# Patient Record
(demographics unavailable — no encounter records)

---

## 2024-10-09 NOTE — HISTORY OF PRESENT ILLNESS
[FreeTextEntry1] : I saw patient Yeny Martinez in the office today.  The patient is a 46-year-old female who has a history of ADHD, PCOS and hypothyroidism.  The patient has a history of obesity and did have Lap-Band surgery a number of years ago.  She had significant issues after the surgery, and it was reversed.  The patient currently is on Mounjaro and lost 30 to 40 pounds.  The patient is on metformin for PCOS and is up-to-date on her gynecological exams.  The patient does not abuse caffeine or ethanol, and she does not smoke.  The patient has a family history of colon cancer and is due for a colonoscopy.  The patient states her bowel movements are normal with no blood in the stool or on the toilet tissue.  The patient had a colonoscopy done at age 30 for gastrointestinal issues.

## 2024-10-09 NOTE — ASSESSMENT
[FreeTextEntry1] : The patient is a 46-year-old female who is due for a colonoscopy due to her age and a family history of colon cancer.  The exam will be scheduled at her earliest convenience and once performed, I will distribute a copy of the results.  If normal it should be repeated at 5-year intervals.  The risks and benefits were described, and the preparation was sent to the patient's pharmacy

## 2024-10-15 NOTE — ASSESSMENT
[Levothyroxine] : The patient was instructed to take Levothyroxine on an empty stomach, separate from vitamins, and wait at least 30 minutes before eating [FreeTextEntry1] : 47 y/o female with h/o hypothyroidism in the setting of polycystic ovarian syndrome and history of obesity.  Pt responded initially to metformin but did not do quite well with weight loss after lap band surgery. Pt did not f/u with the bariatric team. She had her lap-band removed in 07/2019. Patient recently started Mounjaro 15 mg weekly with good weight loss. Patient has restarted Metformin as she believes she had more rapid weight loss with combination therapy. Patient has more recently regained weight despite ongoing tirzepatide.  This drug is no longer being covered by her insurance.  Pharmacist recommended Saxenda.  Menses are regular. Pt not planning pregnancy at this time.  Pt appears clinically euthyroid on Synthroid 125 mcg daily. No local neck pain. No dysphagia or dysphonia. No raciness, shakiness, heat/cold intolerance, tiredness, or fatigue. No palpitations or tremors.      Request labs sent out.   F/u in 6 months.

## 2024-10-15 NOTE — PHYSICAL EXAM
[Alert] : alert [Well Nourished] : well nourished [Obese] : obese [No Acute Distress] : no acute distress [Well Developed] : well developed [Normal Sclera/Conjunctiva] : normal sclera/conjunctiva [EOMI] : extra ocular movement intact [No Proptosis] : no proptosis [Thyroid Not Enlarged] : the thyroid was not enlarged [No Thyroid Nodules] : no palpable thyroid nodules [Clear to Auscultation] : lungs were clear to auscultation bilaterally [Normal S1, S2] : normal S1 and S2 [Normal Rate] : heart rate was normal [Regular Rhythm] : with a regular rhythm [No Edema] : no peripheral edema [Normal Bowel Sounds] : normal bowel sounds [Not Tender] : non-tender [Not Distended] : not distended [Soft] : abdomen soft [Normal Anterior Cervical Nodes] : no anterior cervical lymphadenopathy [No Spinal Tenderness] : no spinal tenderness [Spine Straight] : spine straight [No Stigmata of Cushings Syndrome] : no stigmata of Cushings Syndrome [Normal Gait] : normal gait [Normal Reflexes] : deep tendon reflexes were 2+ and symmetric [No Tremors] : no tremors [Oriented x3] : oriented to person, place, and time [Acanthosis Nigricans] : no acanthosis nigricans [Hirsutism] : no hirsutism

## 2024-10-15 NOTE — HISTORY OF PRESENT ILLNESS
[FreeTextEntry1] : Patient returns for a follow up visit for hypothyroidism and PCOS. Since the last visit pt has no significant interval health changes. No interval hospitalizations, fractures, or changes in medication.   Pt started Mounjaro 10/2022 increased to maximum dose 15 mg for weight management.  Tolerating reasonably well although patient does note some constipation. Patient restarted metformin as she believes she had more rapid weight loss with combination therapy. Patient initially lost weight but has regained approximately 10 pounds more recently.  Her pharmacist told her Mounjaro not be covered but she will be covered  for Saxenda.  Menses regular  Right THR 3/2021 at [place], no complications.  Pt presented with multiple symptoms around age 25 including market weight gain and secondary amenorrhea. She was dx-ed with polycystic ovarian syndrome as well as hypothyroidism. She has been on a variety of doses of levothyroxine since then. Currently on metformin 750 mg daily for polycystic ovarian syndrome. She had gastric band surgery 2014 with about 50 pound weight loss. She had normalization of her menstrual function wall and metformin. Her menses remain regular. She has been told of prediabetes in the past. She has not suffered from hirsutism or a testosterone excess.  . Pt got her lap-band removed 07/2019.  She is currently on Synthroid 125 mcg daily ARMAND. No local neck pain. No dysphagia or dysphonia. No raciness, shakiness, heat/cold intolerance, tiredness, or fatigue. No palpitations or tremors    Of note, pt was born with hip dysplasia.  Patient recently had right hip replacement

## 2025-05-13 NOTE — PHYSICAL EXAM
[Alert] : alert [Well Nourished] : well nourished [Obese] : obese [No Acute Distress] : no acute distress [Well Developed] : well developed [Normal Sclera/Conjunctiva] : normal sclera/conjunctiva [EOMI] : extra ocular movement intact [No Proptosis] : no proptosis [Thyroid Not Enlarged] : the thyroid was not enlarged [No Thyroid Nodules] : no palpable thyroid nodules [Clear to Auscultation] : lungs were clear to auscultation bilaterally [Normal S1, S2] : normal S1 and S2 [Normal Rate] : heart rate was normal [Regular Rhythm] : with a regular rhythm [No Edema] : no peripheral edema [Normal Bowel Sounds] : normal bowel sounds [Not Tender] : non-tender [Not Distended] : not distended [Soft] : abdomen soft [Normal Anterior Cervical Nodes] : no anterior cervical lymphadenopathy [No Spinal Tenderness] : no spinal tenderness [Spine Straight] : spine straight [No Stigmata of Cushings Syndrome] : no stigmata of Cushings Syndrome [Normal Gait] : normal gait [Normal Reflexes] : deep tendon reflexes were 2+ and symmetric [No Tremors] : no tremors [Oriented x3] : oriented to person, place, and time [No Murmurs] : no murmurs [Acanthosis Nigricans] : no acanthosis nigricans [Hirsutism] : no hirsutism

## 2025-05-13 NOTE — END OF VISIT
[FreeTextEntry3] :  This note was written by Juliana Alcala on (May 12, 2025) acting as a medical scribe for Dr. Menendez This note was authored by the medical scribe for me. I have reviewed, edited, and revised the note as needed. I am in agreement with the exam findings, imaging findings, and treatment plan.  Lincoln Menendez MD

## 2025-05-13 NOTE — HISTORY OF PRESENT ILLNESS
[FreeTextEntry1] : Patient returns for a follow up visit for hypothyroidism and PCOS. No interval health changes. No interval hospitalizations, fractures, or changes in medication.   Pt started Mounjaro 10/2022 increased to maximum dose 15 mg for weight management. Tolerating reasonably well although patient does note some constipation. Patient restarted metformin as she believes she had more rapid weight loss with combination therapy. Patient initially lost weight but has regained approximately 10 pounds more recently.  Her pharmacist told her Mounjaro not be covered but she will be covered for Saxenda.n Did not tolerate Saxenda, gained, wt, had GI Sx inc constipation.  Pt. restarted Mounjaro 15mg several weeks ago. Wt was up to 236, now 230  Menses regular.  Right THR 3/2021 at [place], no complications.  Pt presented with multiple symptoms around age 25 including market weight gain and secondary amenorrhea. She was dx-ed with polycystic ovarian syndrome as well as hypothyroidism. She has been on a variety of doses of levothyroxine since then. Currently on metformin 750 mg daily for polycystic ovarian syndrome. She had gastric band surgery 2014 with about 50-pound weight loss. She had normalization of her menstrual function wall and metformin. Her menses remain regular. She has been told of prediabetes in the past. She has not suffered from hirsutism or a testosterone excess. Pt got her lap-band removed 07/2019.  She is currently on Synthroid 125 mcg daily ARMAND. No local neck pain. No dysphagia or dysphonia. No raciness, shakiness, heat/cold intolerance, tiredness, or fatigue. No palpitations or tremors    Of note, pt was born with hip dysplasia.  Patient   had right hip replacement

## 2025-05-13 NOTE — ASSESSMENT
[Levothyroxine] : The patient was instructed to take Levothyroxine on an empty stomach, separate from vitamins, and wait at least 30 minutes before eating [FreeTextEntry1] : 48 y/o female with h/o hypothyroidism in the setting of polycystic ovarian syndrome and history of obesity.  Pt responded initially to metformin but did not do quite well with weight loss after lap band surgery. Pt did not f/u with the bariatric team. She had her lap-band removed in 07/2019. Patient recently started Mounjaro 15 mg weekly with good weight loss. Patient has restarted Metformin as she believes she had more rapid weight loss with combination therapy. Patient has more recently regained weight despite ongoing tirzepatide.  This drug is no longer being covered by her insurance, pharmacist recommended Saxenda. Did not tolerate.  Pt. restarted Mounjaro 15mg.  Menses are regular.  Pt appears clinically euthyroid on Synthroid 125 mcg daily. No local neck pain. No dysphagia or dysphonia. No raciness, shakiness, heat/cold intolerance, tiredness, or fatigue. No palpitations or tremors.      Request labs sent out.   F/u in 6 months.